# Patient Record
Sex: MALE | Race: WHITE | HISPANIC OR LATINO | Employment: FULL TIME | URBAN - METROPOLITAN AREA
[De-identification: names, ages, dates, MRNs, and addresses within clinical notes are randomized per-mention and may not be internally consistent; named-entity substitution may affect disease eponyms.]

---

## 2023-06-16 ENCOUNTER — HOSPITAL ENCOUNTER (EMERGENCY)
Facility: HOSPITAL | Age: 23
Discharge: HOME | End: 2023-06-16
Attending: EMERGENCY MEDICINE
Payer: COMMERCIAL

## 2023-06-16 VITALS
TEMPERATURE: 97.6 F | HEART RATE: 83 BPM | HEIGHT: 65 IN | RESPIRATION RATE: 17 BRPM | DIASTOLIC BLOOD PRESSURE: 75 MMHG | WEIGHT: 220 LBS | BODY MASS INDEX: 36.65 KG/M2 | SYSTOLIC BLOOD PRESSURE: 124 MMHG | OXYGEN SATURATION: 99 %

## 2023-06-16 DIAGNOSIS — M54.50 LUMBAR BACK PAIN: Primary | ICD-10-CM

## 2023-06-16 PROCEDURE — 25010000002 KETOROLAC TROMETHAMINE PER 15 MG: Performed by: EMERGENCY MEDICINE

## 2023-06-16 RX ORDER — HYDROCODONE BITARTRATE AND ACETAMINOPHEN 5; 325 MG/1; MG/1
1 TABLET ORAL EVERY 8 HOURS PRN
Qty: 15 TABLET | Refills: 0 | Status: SHIPPED | OUTPATIENT
Start: 2023-06-16

## 2023-06-16 RX ORDER — CYCLOBENZAPRINE HCL 10 MG
10 TABLET ORAL 3 TIMES DAILY PRN
Qty: 21 TABLET | Refills: 0 | Status: SHIPPED | OUTPATIENT
Start: 2023-06-16

## 2023-06-16 RX ORDER — KETOROLAC TROMETHAMINE 30 MG/ML
30 INJECTION, SOLUTION INTRAMUSCULAR; INTRAVENOUS EVERY 6 HOURS PRN
Status: DISCONTINUED | OUTPATIENT
Start: 2023-06-16 | End: 2023-06-16 | Stop reason: HOSPADM

## 2023-06-16 RX ADMIN — KETOROLAC TROMETHAMINE 30 MG: 30 INJECTION, SOLUTION INTRAMUSCULAR; INTRAVENOUS at 09:01

## 2023-06-16 NOTE — ED NOTES
First contact; pt was ambulatory from waiting room but in obvious pain and slow to walk due to back pain; reports immediately prior to arrival he was @ work and lifted a light weight box and immediately had onset 10/10 lower back pain; hx slipped disk approx 2 years ago with following back strain to R lower back all while in [employer]; has not had problems with pain in approx 1 year does not take daily pain med; most comfortable flat in gurney; gowned and on continuous SPO2, bp monitor; VSS @ this time with regular unlabored respirations on room air; 3/5 strength to RLE due to pain; 4/5 strength LLE; denies and tingling or loss of sensation to extremities; no loss of bladder or bowel control; call light in place; CTM pending MD roth

## 2023-06-16 NOTE — Clinical Note
[place]  [facility] EMERGENCY DEPARTMENT  [address]  [place] [address]  Phone: [phone]    Theodore Lewis was seen and treated in our emergency department on 6/16/2023.  He may return to work on 06/19/2023.         Thank you for choosing [facility].    Damián Suarez MD

## 2023-06-16 NOTE — ED PROVIDER NOTES
"Subjective   History of Present Illness  23-year-old male past medical history significant for \"slipped disc \"in his back for several years.  He states that usually about once a year he has a similar presentation yesterday of sudden onset of back pain when bending over to  something at work today.  Patient states he has difficulty ambulating due to pain and needs to bend forward to help with the pain.  Patient is able to ambulate and did drive him himself here in a private vehicle.  Incident occurred approximately 1 and half hours prior to arrival this morning while at work.  Patient has no other complaints.  Patient is taken no medication for such.  Patient is asking for work note.  Patient denies headache visual changes neck pain jaw pain shortness of breath chest pain anorexia nausea vomiting or diarrhea.    Review of Systems   Constitutional:  Negative for activity change, appetite change, chills, diaphoresis, fatigue and fever.   HENT:  Negative for congestion, sinus pressure, sneezing and sore throat.    Eyes:  Negative for photophobia and visual disturbance.   Respiratory:  Negative for cough and shortness of breath.    Cardiovascular:  Negative for chest pain, palpitations and leg swelling.   Gastrointestinal:  Negative for abdominal distention, abdominal pain, diarrhea, nausea and vomiting.   Genitourinary:  Negative for dysuria and flank pain.   Musculoskeletal:  Positive for back pain. Negative for arthralgias and myalgias.   Skin:  Negative for rash.   Neurological:  Negative for dizziness, weakness and headaches.   Psychiatric/Behavioral:  Negative for behavioral problems and confusion.      Past Medical History:   Diagnosis Date    Injury of back        No Known Allergies    Past Surgical History:   Procedure Laterality Date    TONSILLECTOMY         History reviewed. No pertinent family history.    Social History     Socioeconomic History    Marital status: Significant Other   Tobacco Use    " Smoking status: Former     Packs/day: 0.50     Years: 8.00     Pack years: 4.00     Types: Cigarettes     Quit date: 2021     Years since quittin.0    Smokeless tobacco: Never   Vaping Use    Vaping Use: Never used   Substance and Sexual Activity    Alcohol use: Not Currently    Drug use: Never    Sexual activity: Defer           Objective   Physical Exam  Vitals and nursing note reviewed.   Constitutional:       General: He is not in acute distress.     Appearance: Normal appearance. He is not toxic-appearing or diaphoretic.   HENT:      Head: Normocephalic and atraumatic.      Mouth/Throat:      Mouth: Mucous membranes are moist.   Eyes:      Conjunctiva/sclera: Conjunctivae normal.   Cardiovascular:      Rate and Rhythm: Normal rate.      Pulses: Normal pulses.   Pulmonary:      Effort: Pulmonary effort is normal. No respiratory distress.      Breath sounds: No wheezing or rales.   Abdominal:      General: Abdomen is flat. There is no distension.      Tenderness: There is no abdominal tenderness.   Musculoskeletal:         General: No swelling or signs of injury.      Cervical back: Normal range of motion and neck supple.      Thoracic back: Normal.      Lumbar back: Tenderness present. No swelling, edema, deformity, signs of trauma, lacerations or bony tenderness. Decreased range of motion. Positive right straight leg raise test. Negative left straight leg raise test.        Back:    Skin:     General: Skin is warm and dry.      Findings: No rash.   Neurological:      General: No focal deficit present.      Mental Status: He is alert and oriented to person, place, and time.   Psychiatric:         Mood and Affect: Mood normal.         Behavior: Behavior normal.       Procedures           ED Course  ED Course as of 23 0912      0856 Patient given Toradol IM in the emergency room as patient drove private vehicle and will need to drive home.  Will prescribe pain medication and muscle  relaxants as well as information for referral for back specialist.  Patient states he understands and agrees with plan of discharge home with follow-up as needed as well as return emergency room for new persistent or worsening symptoms. [BH]      ED Course User Index  [] Damián Suarez MD                                           Medical Decision Making  My differential diagnosis for back pain includes but is not limited to:  Musculoskeletal strain, contusion, retroperitoneal hematoma, disc protrusion, vertebral fracture, transverse process fracture, rib fracture, facet syndrome, sacroiliac joint strain, sciatica, renal injury, splenic injury, pancreatic injury, osteoarthritis, lumbar spondylosis, spinal stenosis, ankylosing spondylitis, sacroiliac joint inflammation, pancreatitis, perforated peptic ulcer, diverticulitis, endometriosis, chronic PID, epidural abscess, osteomyelitis, retroperitoneal abscess, pyelonephritis, pneumonia, subphrenic abscess, tuberculosis, neurofibroma, meningioma, multiple myeloma, lymphoma, metastatic cancer, primary cancer, AAA, aortic dissection, spinal ischemia, referred pain, ureterolithiasis     Risk  Prescription drug management.        Final diagnoses:   Lumbar back pain       ED Disposition  ED Disposition       ED Disposition   Discharge    Condition   Stable    Comment   --               Noe Dorado MD  [address] [place] [address] [phone]    Schedule an appointment as soon as possible for a visit   As needed    Kaushik Carr MD  [address] [facility] [address] [phone]    Schedule an appointment as soon as possible for a visit   As needed    [facility] Emergency Department  [address] [address] [phone]  Go to   As needed         Medication List        New Prescriptions      cyclobenzaprine 10 MG tablet  Commonly known as: FLEXERIL  Take 1 tablet by mouth 3 (Three) Times a Day As Needed for  Muscle Spasms.     HYDROcodone-acetaminophen 5-325 MG per tablet  Commonly known as: NORCO  Take 1 tablet by mouth Every 8 (Eight) Hours As Needed for Moderate Pain.               Where to Get Your Medications        These medications were sent to [id number] - [place] [address] [facility] [address] - [phone] PH - [phone]   [address] [place] [address]      Phone: [phone]   cyclobenzaprine 10 MG tablet  HYDROcodone-acetaminophen 5-325 MG per tablet            Damián Suarez MD  06/16/23 0954

## 2023-06-16 NOTE — Clinical Note
[place]  [facility] EMERGENCY DEPARTMENT  [address]  [place] [address]  Phone: [phone]    Theodore Lewis was seen and treated in our emergency department on 6/16/2023.  He may return to work on 06/19/2023.         Thank you for choosing [facility].    Damián Suarez MD

## 2023-10-31 ENCOUNTER — PATIENT ROUNDING (BHMG ONLY) (OUTPATIENT)
Dept: FAMILY MEDICINE CLINIC | Facility: CLINIC | Age: 23
End: 2023-10-31
Payer: COMMERCIAL

## 2023-10-31 ENCOUNTER — OFFICE VISIT (OUTPATIENT)
Dept: FAMILY MEDICINE CLINIC | Facility: CLINIC | Age: 23
End: 2023-10-31
Payer: COMMERCIAL

## 2023-10-31 VITALS
RESPIRATION RATE: 20 BRPM | OXYGEN SATURATION: 99 % | WEIGHT: 233 LBS | BODY MASS INDEX: 38.82 KG/M2 | DIASTOLIC BLOOD PRESSURE: 82 MMHG | HEART RATE: 105 BPM | HEIGHT: 65 IN | SYSTOLIC BLOOD PRESSURE: 112 MMHG

## 2023-10-31 DIAGNOSIS — Z00.00 ENCOUNTER FOR WELL ADULT EXAM WITHOUT ABNORMAL FINDINGS: Primary | ICD-10-CM

## 2023-10-31 PROBLEM — D70.9 NEUTROPENIA: Status: ACTIVE | Noted: 2017-04-27

## 2023-10-31 PROBLEM — R11.2 NAUSEA AND VOMITING: Status: ACTIVE | Noted: 2017-04-25

## 2023-10-31 PROBLEM — M79.10 MUSCLE PAIN: Status: ACTIVE | Noted: 2017-04-25

## 2023-10-31 PROBLEM — R53.83 LACK OF ENERGY: Status: ACTIVE | Noted: 2017-05-01

## 2023-10-31 PROBLEM — R10.9 ABDOMINAL PAIN: Status: ACTIVE | Noted: 2017-04-25

## 2023-10-31 PROBLEM — M54.50 LOW BACK PAIN: Status: ACTIVE | Noted: 2017-01-10

## 2023-10-31 PROBLEM — M54.6 PAIN IN THORACIC SPINE: Status: ACTIVE | Noted: 2017-01-10

## 2023-10-31 NOTE — PROGRESS NOTES
A [employer] message has been sent to the patient for PATIENT ROUNDING with [facility]

## 2023-10-31 NOTE — PROGRESS NOTES
Frank Noonan DO  [facility] PRIMARY CARE  [facility]  [place] [address] [phone]    Subjective      Name Theodore Lewis MRN [id number]    2000 AGE/SEX 23 y.o. / male      Chief Complaint Chief Complaint   Patient presents with    Establish Care     New patient here to establish care         Visit History for  10/31/2023    History of Present Illness  Theodore Lewis 23 y.o. male who presents for an Annual Wellness Visit. He has a history of   Patient Active Problem List   Diagnosis    Acute pharyngitis    Acute upper respiratory infection    Adolescent idiopathic scoliosis    Idiopathic scoliosis of lumbar spine    Brain concussion    Dental caries    Disorder of male genital organ    Disorder of nail    Inflammation of intestine due to virus    Injury of elbow    Intestinal infectious disease    Lack of energy    Abdominal pain    Backache    Backache    Low back pain    Lumbar sprain    Muscle pain    Nausea and vomiting    Neutropenia    Pain in limb    Pain in thoracic spine    Injury of musculoskeletal system    Scoliosis deformity of spine    Viral disease     From [place] in [place].  Moved to [place] for a change of scenery.      Works at a [employer] and works from home.      Has a history of back pain from a slipped disk from wt lifting accident.  Did not have surgery, but did do physical therapy.  Still has constant pain.  Has been to a chiropractor before, but did not feel this did much for him.  Not currently doing anything for his back.  Trying to stay away of medication if possible.   Wt was 166 lbs when he first hurt his back.       Current Life Goals: Would really like to lose wt and will start trying to walk about 30 min a day.       Patient Care Team:  Frank Noonan DO as PCP - General (Family Medicine)      Health Habits     Diet & Exercise:       Diet:     [x] Generally Healthy   [] Low Carb    [] Vegetarian     [] Generally Unhealthy   []  "Gluten Free  [] Vegan       Exercise:     Type: none  Frequency: 0 times/week.       Tobacco Use:     Social History     Tobacco Use   Smoking Status Former    Packs/day: 0.50    Years: 8.00    Additional pack years: 0.00    Total pack years: 4.00    Types: Cigarettes    Quit date: 2021    Years since quittin.4   Smokeless Tobacco Never        Theodore Lewis  reports that he quit smoking about 2 years ago. His smoking use included cigarettes. He has a 4.00 pack-year smoking history. He has never used smokeless tobacco..             Alcohol Use:     Social History     Substance and Sexual Activity   Alcohol Use Not Currently         Counseling Given: [] Yes  [x] No       Dental Exam:   [] Up to date  [] Scheduled  [x] Needed   Last Exam: More than year     Eye Exam:   [x] Up to date  [] Scheduled  [] Needed   Last Exam: 2023     Screenings:     PHQ-9 Depression Screening  Little interest or pleasure in doing things? 0-->not at all   Feeling down, depressed, or hopeless? 0-->not at all   Trouble falling or staying asleep, or sleeping too much?     Feeling tired or having little energy?     Poor appetite or overeating?     Feeling bad about yourself - or that you are a failure or have let yourself or your family down?     Trouble concentrating on things, such as reading the newspaper or watching television?     Moving or speaking so slowly that other people could have noticed? Or the opposite - being so fidgety or restless that you have been moving around a lot more than usual?     Thoughts that you would be better off dead, or of hurting yourself in some way?     PHQ-9 Total Score 0   If you checked off any problems, how difficult have these problems made it for you to do your work, take care of things at home, or get along with other people?        Hepatitis C Screening:   No results found for: \"HEPCVIRUSABY\"    Lung Cancer Screening: Qualifies? [] Yes  [x] No   Completed:    Colon Cancer Screening: " "  Last Completed Colonoscopy       This patient has no relevant Health Maintenance data.             Prostate Cancer Screening:   No results found for: \"PSA\"       Advance Care Planning  Patient does not have an advance directive, information provided.    Review of Systems    The following portions of the patient's history were reviewed and updated as appropriate: allergies, current medications, past family history, past medical history, past social history, past surgical history and problem list.     Past Medical, Family, Social History     Medical History: has a past medical history of Injury of back.   Surgical History: has a past surgical history that includes Tonsillectomy.   Family History: family history includes Cancer in his maternal grandmother and mother; Hyperlipidemia in his father; Hypertension in his father.   Social History: reports that he quit smoking about 2 years ago. His smoking use included cigarettes. He has a 4.00 pack-year smoking history. He has never used smokeless tobacco. He reports that he does not currently use alcohol. He reports that he does not use drugs.       Medications and Allergies   Current Outpatient Medications   Medication Instructions    cyclobenzaprine (FLEXERIL) 10 mg, Oral, 3 Times Daily PRN    HYDROcodone-acetaminophen (NORCO) 5-325 MG per tablet 1 tablet, Oral, Every 8 Hours PRN     No Known Allergies       Objective:      Vitals Vitals:    10/31/23 1401   BP: 112/82   BP Location: Right arm   Patient Position: Sitting   Cuff Size: Large Adult   Pulse: 105   Resp: 20   SpO2: 99%   Weight: 106 kg (233 lb)   Height: 165.1 cm (65\")     Body mass index is 38.77 kg/m².    Physical Exam  Vitals reviewed.   Constitutional:       General: He is not in acute distress.     Appearance: He is not ill-appearing.   Cardiovascular:      Rate and Rhythm: Normal rate and regular rhythm.   Pulmonary:      Effort: Pulmonary effort is normal.      Breath sounds: Normal breath sounds. "   Neurological:      Mental Status: He is alert.   Psychiatric:         Mood and Affect: Mood normal.         Behavior: Behavior normal.         Thought Content: Thought content normal.         Judgment: Judgment normal.            Assessment/Plan      Issues Addressed/ Plan   Diagnosis Plan   1. Encounter for well adult exam without abnormal findings          Overall patient is in good health.  No abnormal findings on physical exam.  Patient would like to discuss weight loss further at a subsequent visit.    Discussion:    Wears seat belt? [x] Yes  [] No     Wears sunscreen? [x] Yes  [] No      BMI: Body mass index is 38.77 kg/m².              There are no Patient Instructions on file for this visit.      Follow up  recommended Return in about 2 weeks (around 11/14/2023) for Wt check.     Frank Noonan, DO

## 2023-11-14 ENCOUNTER — OFFICE VISIT (OUTPATIENT)
Dept: FAMILY MEDICINE CLINIC | Facility: CLINIC | Age: 23
End: 2023-11-14
Payer: COMMERCIAL

## 2023-11-14 VITALS
OXYGEN SATURATION: 97 % | DIASTOLIC BLOOD PRESSURE: 68 MMHG | WEIGHT: 231.6 LBS | HEIGHT: 65 IN | HEART RATE: 94 BPM | RESPIRATION RATE: 20 BRPM | SYSTOLIC BLOOD PRESSURE: 118 MMHG | BODY MASS INDEX: 38.59 KG/M2

## 2023-11-14 DIAGNOSIS — M54.50 CHRONIC BILATERAL LOW BACK PAIN WITHOUT SCIATICA: ICD-10-CM

## 2023-11-14 DIAGNOSIS — G89.29 CHRONIC BILATERAL LOW BACK PAIN WITHOUT SCIATICA: ICD-10-CM

## 2023-11-14 DIAGNOSIS — E66.09 CLASS 2 OBESITY DUE TO EXCESS CALORIES WITHOUT SERIOUS COMORBIDITY WITH BODY MASS INDEX (BMI) OF 38.0 TO 38.9 IN ADULT: Primary | ICD-10-CM

## 2023-11-14 NOTE — PROGRESS NOTES
"    Frank Noonan DO  [facility] PRIMARY CARE  [address]  [place] [id number] [phone]    Subjective      Name Theodore Lewis MRN [id number]    2000 AGE/SEX 23 y.o. / male      Chief Complaint Chief Complaint   Patient presents with    Back Pain     Follow up          Visit History for  2023    History of Present Illness  Theodore Lewis is a 23 y.o. male who presented today for Back Pain (Follow up )    The patient presents for a follow-up on back pain and weight loss.    In the last 2 weeks, has lost at least 2 pounds. Has tried to change diet and reduce snacking, which has been working well. Has been taking 30-minute walks.     Has been doing some back stretches in the mornings for back pain, which has helped a lot in alleviating the pain. When first started walking, was experiencing back pain, but this has improved upon beginning the stretches.       Medications and Allergies   No current outpatient medications    No Known Allergies   I have reviewed the above medications and allergies     Objective:      Vitals Vitals:    23 1356   BP: 118/68   BP Location: Right arm   Patient Position: Sitting   Cuff Size: Adult   Pulse: 94   Resp: 20   SpO2: 97%   Weight: 105 kg (231 lb 9.6 oz)   Height: 165.1 cm (65\")     Body mass index is 38.54 kg/m².    Physical Exam  Vitals reviewed.   Constitutional:       General: He is not in acute distress.     Appearance: He is not ill-appearing.   Pulmonary:      Effort: Pulmonary effort is normal.   Psychiatric:         Mood and Affect: Mood normal.         Behavior: Behavior normal.         Thought Content: Thought content normal.         Judgment: Judgment normal.            Assessment/Plan      Issues Addressed/ Plan  1. Class 2 obesity due to excess calories without serious comorbidity with body mass index (BMI) of 38.0 to 38.9 in adult  Encouraged to continue working on weight loss and maintaining a healthy diet.    2. " Chronic bilateral low back pain without sciatica  Continue doing back stretches.      There are no Patient Instructions on file for this visit.         Follow up  recommended Return in about 3 months (around 2/14/2024) for Wt check.   - Dragon voice recognition software was utilized to complete this chart.  Every reasonable attempt was made to edit and correct the text, however some incorrect words may remain.   Frank Noonan DO       Transcribed from ambient dictation for Frank Noonan DO by Lou Wayne.  11/14/23   18:04 EST    Patient or patient representative verbalized consent to the visit recording.  I have personally performed the services described in this document as transcribed by the above individual, and it is both accurate and complete.

## 2024-03-15 ENCOUNTER — HOSPITAL ENCOUNTER (EMERGENCY)
Facility: HOSPITAL | Age: 24
Discharge: HOME | End: 2024-03-15
Attending: EMERGENCY MEDICINE
Payer: COMMERCIAL

## 2024-03-15 VITALS
RESPIRATION RATE: 16 BRPM | OXYGEN SATURATION: 99 % | HEART RATE: 81 BPM | TEMPERATURE: 98.7 F | HEIGHT: 65 IN | DIASTOLIC BLOOD PRESSURE: 80 MMHG | SYSTOLIC BLOOD PRESSURE: 129 MMHG | BODY MASS INDEX: 38.65 KG/M2 | WEIGHT: 232 LBS

## 2024-03-15 DIAGNOSIS — M54.41 ACUTE MIDLINE LOW BACK PAIN WITH RIGHT-SIDED SCIATICA: Primary | ICD-10-CM

## 2024-03-15 PROCEDURE — 25010000002 KETOROLAC TROMETHAMINE PER 15 MG: Performed by: EMERGENCY MEDICINE

## 2024-03-15 PROCEDURE — 25010000002 MORPHINE PER 10 MG: Performed by: EMERGENCY MEDICINE

## 2024-03-15 PROCEDURE — 63710000001 PREDNISONE PER 1 MG: Performed by: EMERGENCY MEDICINE

## 2024-03-15 PROCEDURE — 99283 EMERGENCY DEPT VISIT LOW MDM: CPT

## 2024-03-15 PROCEDURE — 96372 THER/PROPH/DIAG INJ SC/IM: CPT

## 2024-03-15 RX ORDER — ORPHENADRINE CITRATE 100 MG/1
100 TABLET, EXTENDED RELEASE ORAL 2 TIMES DAILY PRN
Qty: 30 TABLET | Refills: 0 | Status: SHIPPED | OUTPATIENT
Start: 2024-03-15

## 2024-03-15 RX ORDER — PREDNISONE 20 MG/1
60 TABLET ORAL ONCE
Status: COMPLETED | OUTPATIENT
Start: 2024-03-15 | End: 2024-03-15

## 2024-03-15 RX ORDER — KETOROLAC TROMETHAMINE 30 MG/ML
30 INJECTION, SOLUTION INTRAMUSCULAR; INTRAVENOUS EVERY 6 HOURS PRN
Status: DISCONTINUED | OUTPATIENT
Start: 2024-03-15 | End: 2024-03-15 | Stop reason: HOSPADM

## 2024-03-15 RX ORDER — HYDROCODONE BITARTRATE AND ACETAMINOPHEN 5; 325 MG/1; MG/1
1 TABLET ORAL EVERY 6 HOURS PRN
Qty: 12 TABLET | Refills: 0 | Status: SHIPPED | OUTPATIENT
Start: 2024-03-15

## 2024-03-15 RX ORDER — METHYLPREDNISOLONE 4 MG/1
TABLET ORAL
Qty: 21 TABLET | Refills: 0 | Status: SHIPPED | OUTPATIENT
Start: 2024-03-15

## 2024-03-15 RX ADMIN — PREDNISONE 60 MG: 20 TABLET ORAL at 02:37

## 2024-03-15 RX ADMIN — KETOROLAC TROMETHAMINE 30 MG: 30 INJECTION, SOLUTION INTRAMUSCULAR; INTRAVENOUS at 02:37

## 2024-03-15 RX ADMIN — MORPHINE SULFATE 4 MG: 4 INJECTION, SOLUTION INTRAMUSCULAR; INTRAVENOUS at 02:39

## 2024-03-15 NOTE — Clinical Note
[place]  [facility] EMERGENCY DEPARTMENT  [address] [place] [address]  Phone: [phone]    Theodore Lewis was seen and treated in our emergency department on 3/15/2024.  He may return to work on 03/18/2024.         Thank you for choosing [facility].    Boom Batres MD

## 2024-03-15 NOTE — ED PROVIDER NOTES
Subjective   History of Present Illness  25 yo male presents w low back pain, started yesterday, tried ibuprofen w no relief. Had one episode prior about a year ago. Similar location, bilateral lower lumbar spine, radiates to right hip. Ambulates normally. No urinary retention or incontinence, or sensory loss, no motor weakness. Says he works at home and sits at the computer for long hours, o/w denies any injury. No fever. No flank pain, dysuria, hematuria, nausea or vomiting. Not in pain management or seen spine specialist.     History provided by:  Patient      Review of Systems   All other systems reviewed and are negative.      Past Medical History:   Diagnosis Date    Injury of back        No Known Allergies    Past Surgical History:   Procedure Laterality Date    TONSILLECTOMY         Family History   Problem Relation Age of Onset    Cancer Mother         breast cancer    Hypertension Father     Hyperlipidemia Father     Cancer Maternal Grandmother         liver       Social History     Socioeconomic History    Marital status: Significant Other   Tobacco Use    Smoking status: Former     Current packs/day: 0.00     Average packs/day: 0.5 packs/day for 8.0 years (4.0 ttl pk-yrs)     Types: Cigarettes     Start date: 2013     Quit date: 2021     Years since quittin.7    Smokeless tobacco: Never   Vaping Use    Vaping status: Never Used   Substance and Sexual Activity    Alcohol use: Not Currently    Drug use: Never    Sexual activity: Defer           Objective   Physical Exam  Vitals and nursing note reviewed.   Constitutional:       General: He is not in acute distress.     Appearance: Normal appearance. He is not ill-appearing or toxic-appearing.   HENT:      Head: Normocephalic.      Nose: Nose normal.      Mouth/Throat:      Mouth: Mucous membranes are moist.   Eyes:      Pupils: Pupils are equal, round, and reactive to light.   Cardiovascular:      Rate and Rhythm: Normal rate and regular  rhythm.      Pulses: Normal pulses.      Heart sounds: Normal heart sounds.   Pulmonary:      Effort: Pulmonary effort is normal. No respiratory distress.      Breath sounds: Normal breath sounds.   Abdominal:      Palpations: Abdomen is soft.      Tenderness: There is no abdominal tenderness.   Musculoskeletal:         General: No tenderness or deformity. Normal range of motion.      Cervical back: Normal range of motion.   Skin:     General: Skin is warm.   Neurological:      General: No focal deficit present.      Mental Status: He is alert.   Psychiatric:         Mood and Affect: Mood normal.         Procedures           ED Course                                             Medical Decision Making  Discussed w patient treatment plan, and that I wouldn't be poerforming imaging given no trauma, he understands, goal of treatment to decrease pain to functional level w IM meds, and DC short course of norco, not in pain mgmt, understands dependency potential and to take as needed, also muscle relaxers and steroids, provided pain management follow up if problems persist.     Problems Addressed:  Acute midline low back pain with right-sided sciatica: complicated acute illness or injury    Risk  Prescription drug management.        Final diagnoses:   Acute midline low back pain with right-sided sciatica       ED Disposition  ED Disposition       ED Disposition   Discharge    Condition   Stable    Comment   --               Frank Noonan, DO  [address] [facility] [address] [phone]    Schedule an appointment as soon as possible for a visit       [facility]  [address] [place], [place] [id number] [phone]  Call   As needed, If symptoms worsen         Medication List        New Prescriptions      HYDROcodone-acetaminophen 5-325 MG per tablet  Commonly known as: NORCO  Take 1 tablet by mouth Every 6 (Six) Hours As Needed for Severe Pain for up to 12 doses.      methylPREDNISolone 4 MG dose pack  Commonly known as: MEDROL  Take as directed on package instructions.     orphenadrine 100 MG 12 hr tablet  Commonly known as: NORFLEX  Take 1 tablet by mouth 2 (Two) Times a Day As Needed for Muscle Spasms for up to 30 doses.               Where to Get Your Medications        These medications were sent to [id number] - [place], [place] [address] AT [facility] [address] - [phone]  - [phone] [place] [address], [place] [address]      Phone: [phone]   HYDROcodone-acetaminophen 5-325 MG per tablet  methylPREDNISolone 4 MG dose pack  orphenadrine 100 MG 12 hr tablet            Boom Batres MD  03/15/24 7841

## 2024-03-27 ENCOUNTER — OFFICE VISIT (OUTPATIENT)
Dept: FAMILY MEDICINE CLINIC | Facility: CLINIC | Age: 24
End: 2024-03-27
Payer: COMMERCIAL

## 2024-03-27 VITALS
HEIGHT: 65 IN | WEIGHT: 240 LBS | DIASTOLIC BLOOD PRESSURE: 84 MMHG | RESPIRATION RATE: 18 BRPM | SYSTOLIC BLOOD PRESSURE: 102 MMHG | BODY MASS INDEX: 39.99 KG/M2 | OXYGEN SATURATION: 98 % | HEART RATE: 74 BPM

## 2024-03-27 DIAGNOSIS — M54.41 ACUTE RIGHT-SIDED LOW BACK PAIN WITH RIGHT-SIDED SCIATICA: Primary | ICD-10-CM

## 2024-03-27 DIAGNOSIS — M54.41 ACUTE MIDLINE LOW BACK PAIN WITH RIGHT-SIDED SCIATICA: ICD-10-CM

## 2024-03-27 RX ORDER — METHOCARBAMOL 500 MG/1
500 TABLET, FILM COATED ORAL 4 TIMES DAILY
Qty: 40 TABLET | Refills: 0 | Status: SHIPPED | OUTPATIENT
Start: 2024-03-27

## 2024-03-27 RX ORDER — HYDROCODONE BITARTRATE AND ACETAMINOPHEN 5; 325 MG/1; MG/1
1 TABLET ORAL EVERY 6 HOURS PRN
Qty: 12 TABLET | Refills: 0 | Status: SHIPPED | OUTPATIENT
Start: 2024-03-27

## 2024-03-27 RX ORDER — MELOXICAM 7.5 MG/1
7.5 TABLET ORAL DAILY
Qty: 30 TABLET | Refills: 2 | Status: SHIPPED | OUTPATIENT
Start: 2024-03-27

## 2024-03-27 NOTE — PROGRESS NOTES
Frank Noonan DO  [facility] PRIMARY CARE  [address]  [place] [address] [phone]    Subjective      Name Theodore Lewis MRN [id number]    2000 AGE/SEX 24 y.o. / male      Chief Complaint Chief Complaint   Patient presents with    Hospital Follow Up Visit     Was seen in ED for back pain. Has not had improvement, ROM causes discomfort          Visit History for  2024    History of Present Illness  Theodore Lewis is a 24 y.o. male who presented today for Hospital Follow Up Visit (Was seen in ED for back pain. Has not had improvement, ROM causes discomfort )    Chronic back pain issues and now experiencing a flareup. Walks for 30 minutes every day and is closely monitoring diet. The patient was approved for food stamps, and this has made making healthier choices easier. Overall, back pain has improved, and recent flare up caused concerns. Complains of pain in entire back hurt, particularly in lower lumbar area. The pain progressively worsened throughout the week. When laying down the pain resolves, but if stretching ankle in any way, the pain radiates up leg and causes pain immediately with or without medication. The patient is unsure if the pain is related to a nerve being affected or if it is solely the back. Patient was prescribed hydrocodone, prednisone, and Norflex for the pain which decreases but does not alleviate the pain. The pain occasionally starts at the back and then radiates to the left and then down toward lower extremities. Yesterday, the pain was so intense that the patient was severely nauseous and could not eat.        Medications and Allergies   Current Outpatient Medications   Medication Instructions    HYDROcodone-acetaminophen (NORCO) 5-325 MG per tablet 1 tablet, Oral, Every 6 Hours PRN    meloxicam (MOBIC) 7.5 mg, Oral, Daily    methocarbamol (ROBAXIN) 500 mg, Oral, 4 Times Daily    methylPREDNISolone (MEDROL) 4 MG dose pack Take as  "directed on package instructions.    orphenadrine (NORFLEX) 100 mg, Oral, 2 Times Daily PRN     No Known Allergies   I have reviewed the above medications and allergies     Objective:      Vitals Vitals:    03/27/24 1138   BP: 102/84   BP Location: Left arm   Patient Position: Sitting   Cuff Size: Large Adult   Pulse: 74   Resp: 18   SpO2: 98%   Weight: 109 kg (240 lb)   Height: 165.1 cm (65\")     Body mass index is 39.94 kg/m².    Physical Exam  Vitals reviewed.   Constitutional:       General: He is not in acute distress.     Appearance: He is not ill-appearing.   Pulmonary:      Effort: Pulmonary effort is normal.   Psychiatric:         Mood and Affect: Mood normal.         Behavior: Behavior normal.         Thought Content: Thought content normal.         Judgment: Judgment normal.            Assessment/Plan      Issues Addressed/ Plan   Diagnosis Plan   1. Acute right-sided low back pain with right-sided sciatica  meloxicam (Mobic) 7.5 MG tablet    XR Spine Lumbar 2 or 3 View    methocarbamol (ROBAXIN) 500 MG tablet      2. Acute midline low back pain with right-sided sciatica  HYDROcodone-acetaminophen (NORCO) 5-325 MG per tablet    methocarbamol (ROBAXIN) 500 MG tablet         1. Back pain.  This is a temporary occurrence where the muscle spasms. The patient has sciatic pain. An order was placed for a lower back x-ray to rule out listhesis. Prescribed meloxicam and Robaxin for pain relief and refilled hydrocodone. Advised the patient to avoid ibuprofen or aspirin. Osteopathic manipulation was performed today.    - Patient was found to have somatic dysfunction contributing to their discomfort and was treated with OMT today.  OMT was aimed at increasing range of motion, decreasing pain, and treatment of somatic dysfunction.      - Patient tolerated today's treatment well with improvements noted both objectively and subjectively on reevaluation.    - Advised the patient that they may experience increased " soreness for 1-2 days post-treatment.  This can be ameliorated with increased water intake, heat or ice application, and gentle stretches.        There are no Patient Instructions on file for this visit.            Follow up  recommended Return if symptoms worsen or fail to improve.   - Dragon voice recognition software was utilized to complete this chart.  Every reasonable attempt was made to edit and correct the text, however some incorrect words may remain.       Transcribed from ambient dictation for Frank Noonan DO by Rhiannon Glover.  03/27/24   12:45 EDT    Patient or patient representative verbalized consent to the visit recording.  I have personally performed the services described in this document as transcribed by the above individual, and it is both accurate and complete.

## 2024-04-29 ENCOUNTER — OFFICE VISIT (OUTPATIENT)
Dept: FAMILY MEDICINE CLINIC | Facility: CLINIC | Age: 24
End: 2024-04-29
Payer: COMMERCIAL

## 2024-04-29 VITALS
RESPIRATION RATE: 18 BRPM | OXYGEN SATURATION: 98 % | HEART RATE: 84 BPM | WEIGHT: 246 LBS | DIASTOLIC BLOOD PRESSURE: 104 MMHG | SYSTOLIC BLOOD PRESSURE: 138 MMHG | HEIGHT: 65 IN | BODY MASS INDEX: 40.98 KG/M2

## 2024-04-29 DIAGNOSIS — M54.41 ACUTE MIDLINE LOW BACK PAIN WITH RIGHT-SIDED SCIATICA: ICD-10-CM

## 2024-04-29 DIAGNOSIS — M54.41 ACUTE RIGHT-SIDED LOW BACK PAIN WITH RIGHT-SIDED SCIATICA: ICD-10-CM

## 2024-04-29 PROCEDURE — 1159F MED LIST DOCD IN RCRD: CPT | Performed by: STUDENT IN AN ORGANIZED HEALTH CARE EDUCATION/TRAINING PROGRAM

## 2024-04-29 PROCEDURE — 1160F RVW MEDS BY RX/DR IN RCRD: CPT | Performed by: STUDENT IN AN ORGANIZED HEALTH CARE EDUCATION/TRAINING PROGRAM

## 2024-04-29 PROCEDURE — 99213 OFFICE O/P EST LOW 20 MIN: CPT | Performed by: STUDENT IN AN ORGANIZED HEALTH CARE EDUCATION/TRAINING PROGRAM

## 2024-04-29 RX ORDER — METHOCARBAMOL 500 MG/1
500 TABLET, FILM COATED ORAL 4 TIMES DAILY
Qty: 40 TABLET | Refills: 0 | Status: SHIPPED | OUTPATIENT
Start: 2024-04-29 | End: 2024-05-02 | Stop reason: SDUPTHER

## 2024-04-30 NOTE — PROGRESS NOTES
Frank Noonan DO  [facility] PRIMARY CARE  [address]  [place] [address] [phone]    Subjective      Name Theodore Lewis MRN [id number]    2000 AGE/SEX 24 y.o. / male      Chief Complaint Chief Complaint   Patient presents with    Back Pain     Still having lower back pain           Visit History for  2024    History of Present Illness  Theodore Lewis is a 24 y.o. male who presented today for Back Pain (Still having lower back pain/)      History of Present Illness  The patient presents for evaluation of low back pain.    The patient, a 3-year-old young child, has been experiencing persistent left-sided low back pain, which has significantly impeded his ability to work and rise from bed. The onset of the pain is unclear, as he was up until 2 a.m. today due to his young children's illness. He recalls an incident where he twisted his back while moving his bed. Despite the persistent acute pain, he has not undergone physical therapy. He quantifies his pain as a constant 4 on a scale of 10, which intensifies when he bends down or picks something up, but not to the extent that it impedes his functionality. The pain is present even during physical activities such as walking, sitting down, or lying down. He has been managing his pain with meloxicam and hydrocodone, having only consumed two hydrocodone tablets today due to the absence of pain. He describes the pain as akin to bruising, radiating into his left buttock. He has been performing stretches, but finds them extremely painful. He has been using Biofreeze for pain management.   His mother was recently diagnosed with an inflamed liver.        Medications and Allergies   Current Outpatient Medications   Medication Instructions    HYDROcodone-acetaminophen (NORCO) 5-325 MG per tablet 1 tablet, Oral, Every 6 Hours PRN    meloxicam (MOBIC) 7.5 mg, Oral, Daily    methocarbamol (ROBAXIN) 500 mg, Oral, 4 Times Daily     "orphenadrine (NORFLEX) 100 mg, Oral, 2 Times Daily PRN     No Known Allergies   I have reviewed the above medications and allergies     Objective:      Vitals Vitals:    04/29/24 1432   BP: (!) 138/104   BP Location: Left arm   Patient Position: Sitting   Cuff Size: Adult   Pulse: 84   Resp: 18   SpO2: 98%   Weight: 112 kg (246 lb)   Height: 165.1 cm (65\")     Body mass index is 40.94 kg/m².    Physical Exam  Physical Exam  Positive seated flexion test on the left side of the back. Pain is almost over the base of the scapula into L5 and over the SI joint.   Results          Assessment/Plan      Issues Addressed/ Plan   Diagnosis Plan   1. Acute right-sided low back pain with right-sided sciatica        2. Acute midline low back pain with right-sided sciatica           Assessment & Plan  1. Left-sided low back pain.  The patient was counseled to apply ice to the affected area for a duration of 15 to 20 minutes, followed by a heat pack for a period of 15 to 20 minutes. He was cautioned against using a heat pad for more than 30 minutes before falling asleep. The application of ice and heat every 2 hours was recommended. A refill of methocarbamol, to be taken four times daily, was provided. The patient was also advised to take Tylenol, with a maximum of 4000 mg, as needed.           There are no Patient Instructions on file for this visit.        Follow up  recommended Return if symptoms worsen or fail to improve.   - Dragon voice recognition software was utilized to complete this chart.  Every reasonable attempt was made to edit and correct the text, however some incorrect words may remain.   Frank Noonan DO    Patient or patient representative verbalized consent for the use of Ambient Listening during the visit with  Frank Noonan DO for chart documentation. 5/13/2024  01:48 EDT             "

## 2024-05-02 ENCOUNTER — TELEPHONE (OUTPATIENT)
Dept: INTERNAL MEDICINE | Facility: CLINIC | Age: 24
End: 2024-05-02
Payer: COMMERCIAL

## 2024-05-02 DIAGNOSIS — M54.41 ACUTE RIGHT-SIDED LOW BACK PAIN WITH RIGHT-SIDED SCIATICA: ICD-10-CM

## 2024-05-02 DIAGNOSIS — M54.41 ACUTE MIDLINE LOW BACK PAIN WITH RIGHT-SIDED SCIATICA: ICD-10-CM

## 2024-05-02 RX ORDER — METHOCARBAMOL 500 MG/1
500 TABLET, FILM COATED ORAL 4 TIMES DAILY
Qty: 40 TABLET | Refills: 0 | Status: SHIPPED | OUTPATIENT
Start: 2024-05-02

## 2024-05-02 RX ORDER — MELOXICAM 7.5 MG/1
7.5 TABLET ORAL DAILY
Qty: 30 TABLET | Refills: 2 | Status: SHIPPED | OUTPATIENT
Start: 2024-05-02

## 2024-05-02 NOTE — TELEPHONE ENCOUNTER
Theodore Keller  [facility] [place] [facility] (supporting Frank Noonan DO)3 minutes ago (2:20 PM)       Also feels like all my muscles from the lower back and below are tensing up a lot.       Theodore Keller [facility] Clinical Pool (supporting Frank Noonan DO)5 minutes ago (2:19 PM)       Good afternoon Doctor Saran,      I'm not sure what could've caused the pain that I'm feeling but it feels like a constant muscle spasm going to both legs resulting in a pulsing pain. The main location of the pain in my back is relatively not as strong as the pain in my legs and my right foot seems to be tingling almost all the time if I can't find a comfortable way of sitting or laying down. The best relief now is actually walking, but I also can't walk very long. So I'm not sure what to do.

## 2024-05-02 NOTE — TELEPHONE ENCOUNTER
He has probably exacerbated the same back issue again, but now worse.  He can double the Mobic for a few days by taking one in the morning and one at  night.  I will send a refill for the robaxin to be taken every 4 hours.  May need to be seen again in the office

## 2024-05-08 ENCOUNTER — PROCEDURE VISIT (OUTPATIENT)
Dept: FAMILY MEDICINE CLINIC | Facility: CLINIC | Age: 24
End: 2024-05-08
Payer: COMMERCIAL

## 2024-05-08 VITALS
BODY MASS INDEX: 40.48 KG/M2 | HEART RATE: 76 BPM | OXYGEN SATURATION: 96 % | DIASTOLIC BLOOD PRESSURE: 84 MMHG | RESPIRATION RATE: 18 BRPM | SYSTOLIC BLOOD PRESSURE: 122 MMHG | HEIGHT: 65 IN | WEIGHT: 243 LBS

## 2024-05-08 DIAGNOSIS — M54.41 ACUTE MIDLINE LOW BACK PAIN WITH RIGHT-SIDED SCIATICA: ICD-10-CM

## 2024-05-08 DIAGNOSIS — M54.41 ACUTE RIGHT-SIDED LOW BACK PAIN WITH RIGHT-SIDED SCIATICA: Primary | ICD-10-CM

## 2024-05-08 PROCEDURE — 99213 OFFICE O/P EST LOW 20 MIN: CPT | Performed by: STUDENT IN AN ORGANIZED HEALTH CARE EDUCATION/TRAINING PROGRAM

## 2024-05-08 RX ORDER — HYDROCODONE BITARTRATE AND ACETAMINOPHEN 5; 325 MG/1; MG/1
1 TABLET ORAL EVERY 6 HOURS PRN
Qty: 12 TABLET | Refills: 0 | Status: SHIPPED | OUTPATIENT
Start: 2024-05-08

## 2024-05-25 NOTE — PROGRESS NOTES
Frank Noonan DO  [facility] PRIMARY CARE  [address]  [place] [id number] [phone]    Subjective      Name Theodore Lewis MRN [id number]    2000 AGE/SEX 24 y.o. / male      Chief Complaint Chief Complaint   Patient presents with    Back Pain     Follow up and adjustment          Visit History for  2024    History of Present Illness  Theodore Lewis is a 24 y.o. male who presented today for Back Pain (Follow up and adjustment )     The patient reports an improvement in his condition today compared to the previous day, despite the administration of his narcotic medication. Previously, he experienced excruciating pain even after the double dosage of his narcotic medication. He struggles with sitting on soft surfaces, lying down in certain ways, and being bedridden due to back pain. Activities such as laughing, sneezing, and eating occasionally exacerbate his back pain. Certain foods do not induce pain, but they induce weakness and unusual sensations. He has experienced tingling in his left leg for the past 2 days, which has somewhat alleviated. Most of the time, he experiences tingling in the back of his calves, accompanied by a cold sensation in his lower back. . Despite the pain, he maintains his ability to perform daily activities, such as dishwashing and laundry, which exacerbates his pain to a 4 or 5 on a scale of 1 to 10. The initial adjustment provided relief from a constant 3 to 4, but the recent adjustment exacerbated his pain. He began experiencing numbness in his right foot 4 to 5 days ago, which was initially on the left side of his back, but has since spread across his body. He has been managing his pain with ice, ice, and a cane-sized heating pad, which provides some relief. He was able to sit in his office chair yesterday. Currently, he is not taking any pain relievers. Since his realignment, his bones have been popping, which provides some relief. He  "injured his lower back while in the .        Medications and Allergies   Current Outpatient Medications   Medication Instructions    HYDROcodone-acetaminophen (NORCO) 5-325 MG per tablet 1 tablet, Oral, Every 6 Hours PRN    meloxicam (MOBIC) 7.5 mg, Oral, Daily    methocarbamol (ROBAXIN) 500 mg, Oral, 4 Times Daily    orphenadrine (NORFLEX) 100 mg, Oral, 2 Times Daily PRN     No Known Allergies   I have reviewed the above medications and allergies     Objective:      Vitals Vitals:    05/08/24 1137   BP: 122/84   BP Location: Left arm   Patient Position: Sitting   Cuff Size: Large Adult   Pulse: 76   Resp: 18   SpO2: 96%   Weight: 110 kg (243 lb)   Height: 165.1 cm (65\")     Body mass index is 40.44 kg/m².    Physical Exam  Vitals reviewed.   Constitutional:       General: He is not in acute distress.     Appearance: He is not ill-appearing.   Pulmonary:      Effort: Pulmonary effort is normal.   Psychiatric:         Mood and Affect: Mood normal.         Behavior: Behavior normal.         Thought Content: Thought content normal.         Judgment: Judgment normal.       Physical Exam  Positive seated flexion test on the left side of the back. Tightness noted in the hamstrings.   Results          Assessment/Plan      Issues Addressed/ Plan   Diagnosis Plan   1. Acute right-sided low back pain with right-sided sciatica  Ambulatory Referral to Physical Therapy      2. Acute midline low back pain with right-sided sciatica  HYDROcodone-acetaminophen (NORCO) 5-325 MG per tablet         Assessment & Plan  1. Back pain.  An x-ray will be conducted to rule out any underlying conditions. A referral for physical therapy will be made for a duration of 2 weeks. The patient is advised to perform straight leg, hamstring, and calf stretches, but not with socks. The patient is instructed to take meloxicam tonight. A refill of hydrocodone will be provided. Should the patient's numbness and tingling in his legs worsen instead " of improving, an MRI will be necessary.           There are no Patient Instructions on file for this visit.        Follow up  recommended Return if symptoms worsen or fail to improve.   - Dragon voice recognition software was utilized to complete this chart.  Every reasonable attempt was made to edit and correct the text, however some incorrect words may remain.   Frank Noonan DO    Patient or patient representative verbalized consent for the use of Ambient Listening during the visit with  Frank Noonan DO for chart documentation. 5/25/2024  01:19 EDT

## 2024-06-07 ENCOUNTER — TELEPHONE (OUTPATIENT)
Dept: FAMILY MEDICINE CLINIC | Facility: CLINIC | Age: 24
End: 2024-06-07
Payer: COMMERCIAL

## 2024-06-07 NOTE — TELEPHONE ENCOUNTER
Caller: MARY [employer]IGHT [employer]    Relationship:     Best call back number: [phone]    What is the best time to reach you: ASAP    What was the call regarding: [employer] IS TRYING TO DETERMINE IF MOST RECENT VISIT IN MAY ARE BEING RELATED TO WORKERS COMP INJURY FROM JUNE OF 2023. THEY WERE PROVIDED A WORK SLIP FROM EMPLOYER BUT WAS NOT GIVEN DIAGNOSIS. THEY WERE NOT BILLED FOR SERVICES. PLEASE ADVISE.

## 2024-08-08 ENCOUNTER — HOSPITAL ENCOUNTER (EMERGENCY)
Facility: HOSPITAL | Age: 24
Discharge: HOME | End: 2024-08-08
Attending: STUDENT IN AN ORGANIZED HEALTH CARE EDUCATION/TRAINING PROGRAM
Payer: COMMERCIAL

## 2024-08-08 VITALS
RESPIRATION RATE: 18 BRPM | OXYGEN SATURATION: 98 % | DIASTOLIC BLOOD PRESSURE: 85 MMHG | SYSTOLIC BLOOD PRESSURE: 121 MMHG | TEMPERATURE: 98.3 F | HEIGHT: 65 IN | WEIGHT: 244 LBS | BODY MASS INDEX: 40.65 KG/M2 | HEART RATE: 86 BPM

## 2024-08-08 DIAGNOSIS — M54.32 SCIATICA, LEFT SIDE: Primary | ICD-10-CM

## 2024-08-08 PROCEDURE — 99283 EMERGENCY DEPT VISIT LOW MDM: CPT

## 2024-08-08 RX ORDER — DIAZEPAM 5 MG/1
5 TABLET ORAL EVERY 6 HOURS PRN
Status: DISCONTINUED | OUTPATIENT
Start: 2024-08-08 | End: 2024-08-09 | Stop reason: HOSPADM

## 2024-08-08 RX ORDER — CYCLOBENZAPRINE HCL 5 MG
5 TABLET ORAL 3 TIMES DAILY PRN
Qty: 30 TABLET | Refills: 0 | Status: SHIPPED | OUTPATIENT
Start: 2024-08-08

## 2024-08-08 RX ORDER — IBUPROFEN 600 MG/1
600 TABLET ORAL EVERY 6 HOURS PRN
Qty: 30 TABLET | Refills: 0 | Status: SHIPPED | OUTPATIENT
Start: 2024-08-08

## 2024-08-08 RX ORDER — PREDNISONE 50 MG/1
50 TABLET ORAL DAILY
Qty: 5 TABLET | Refills: 0 | Status: SHIPPED | OUTPATIENT
Start: 2024-08-08 | End: 2024-08-13

## 2024-08-08 RX ORDER — CYCLOBENZAPRINE HCL 10 MG
10 TABLET ORAL ONCE
Status: COMPLETED | OUTPATIENT
Start: 2024-08-08 | End: 2024-08-08

## 2024-08-08 RX ADMIN — IBUPROFEN 600 MG: 200 TABLET, FILM COATED ORAL at 21:32

## 2024-08-08 RX ADMIN — DIAZEPAM 5 MG: 5 TABLET ORAL at 21:32

## 2024-08-08 RX ADMIN — CYCLOBENZAPRINE 10 MG: 10 TABLET, FILM COATED ORAL at 21:32

## 2024-08-08 NOTE — Clinical Note
[place]  [facility] EMERGENCY DEPARTMENT  [address] [place] [address]  Phone: [phone]    Theodore Lewis was seen and treated in our emergency department on 8/8/2024.  He may return to work on 08/13/2024.         Thank you for choosing [facility].    Mika Sanford, DO

## 2024-08-09 NOTE — DISCHARGE INSTRUCTIONS

## 2024-08-09 NOTE — ED PROVIDER NOTES
"Subjective   History of Present Illness  This is a healthy 24-year-old who presents to the emergency department with left low back pain that radiates down the left posterior and lateral aspect of his leg.  It is a sharp shooting type pain.  He describes that he has had this since he lifted a dresser couple of days ago.  Denies any numbness, tingling, loss of sensation, urinary or bowel abnormality such as incontinence or retention.  He denies any paresthesias or difficulty with ambulation.  He states that he has had this in the past and was supposed to get an MRI but \"forgot or got too busy.\"  He denies any new or changing symptoms today.      Review of Systems   Constitutional:  Negative for activity change, chills, diaphoresis and fever.   HENT: Negative.     Respiratory: Negative.     Cardiovascular: Negative.    Skin: Negative.    Neurological: Negative.    All other systems reviewed and are negative.      Past Medical History:   Diagnosis Date    Injury of back        No Known Allergies    Past Surgical History:   Procedure Laterality Date    TONSILLECTOMY         Family History   Problem Relation Age of Onset    Cancer Mother         breast cancer    Hypertension Father     Hyperlipidemia Father     Cancer Maternal Grandmother         liver       Social History     Socioeconomic History    Marital status: Significant Other   Tobacco Use    Smoking status: Former     Current packs/day: 0.00     Average packs/day: 0.5 packs/day for 8.0 years (4.0 ttl pk-yrs)     Types: Cigarettes     Start date: 6/1/2013     Quit date: 6/1/2021     Years since quitting: 3.1    Smokeless tobacco: Never   Vaping Use    Vaping status: Never Used   Substance and Sexual Activity    Alcohol use: Not Currently    Drug use: Never    Sexual activity: Yes     Partners: Female     Birth control/protection: None           Objective   Physical Exam  Vitals and nursing note reviewed.   Constitutional:       Appearance: Normal appearance. He " is normal weight. He is not ill-appearing, toxic-appearing or diaphoretic.   HENT:      Head: Normocephalic and atraumatic.      Right Ear: External ear normal.      Left Ear: External ear normal.      Nose: Nose normal. No congestion or rhinorrhea.      Mouth/Throat:      Pharynx: No oropharyngeal exudate or posterior oropharyngeal erythema.   Eyes:      Extraocular Movements: Extraocular movements intact.      Conjunctiva/sclera: Conjunctivae normal.      Pupils: Pupils are equal, round, and reactive to light.   Cardiovascular:      Rate and Rhythm: Normal rate and regular rhythm.      Pulses: Normal pulses.   Pulmonary:      Effort: Pulmonary effort is normal.      Breath sounds: Normal breath sounds. No stridor. No wheezing, rhonchi or rales.   Chest:      Chest wall: No tenderness.   Abdominal:      General: There is no distension.      Palpations: Abdomen is soft. There is no mass.   Musculoskeletal:         General: No swelling, tenderness or signs of injury. Normal range of motion.      Cervical back: Normal range of motion. No rigidity or tenderness.      Comments: No incontinence or retention, no saddle distribution of numbness or tingling. Dorsiflexion and plantar flexion are 5/5 in strength bilaterally. No limb weakness. No foot drop. No decrease in anal sphincter tone. No bony tenderness noted.   Skin:     General: Skin is warm.      Capillary Refill: Capillary refill takes less than 2 seconds.      Coloration: Skin is not jaundiced or pale.      Findings: No bruising.   Neurological:      General: No focal deficit present.      Mental Status: He is alert and oriented to person, place, and time. Mental status is at baseline.      Motor: No weakness.         Procedures           ED Course                                             Medical Decision Making    MDM:    Escalation of care including admission/observation considered    - Discussions of management with other providers:  None    -  Discussed/reviewed with Radiology regarding test interpretation    - Independent interpretation: None    - Additional patient history obtained from: Partner    - Review of external non-ED record (if available):  Prior Inpt record, Office record, Outpt record, Prior Outpt labs, PCP record, Outside ED record, Other    - Chronic conditions affecting care: See HPI and medical Hx.    - Social Determinants of health significantly affecting care:  None        Medical Decision Making Discussion:    This is a well-appearing 24-year-old who presents to the emergency department with what appears to be sciatic type pain.  The patient has a normal neurological examination, no tenderness at the midline of the lumbar spine.  The patient has no difficulty with ambulation but he does have some radiating/shooting pain down the left lateral leg.  The patient given pain control here, I will give a short course of steroid and NSAID use at home.  Recommend physical therapy on an outpatient basis as well as follow-up with PCP.  As there is no red flag symptom of low back injury or suspicion for spinal cord compromise, the patient is well-appearing and stable for discharge at this time.  Patient otherwise well-appearing, he and his spouse are very agreeable to this plan.    The patient has been given very strict return precautions to return to the emergency department should there be any acute change or worsening of their condition.  I have explained my findings and the patient has expressed understanding to me.  I explained that the work-up performed in the ED has been based on the specific complaint and concern, as the nature of emergency medicine is complaint driven and they understand that new symptoms may arise.  I have told them that, should there be any new symptoms, worsening or changing symptoms, a new work-up may be indicated that they are encouraged to return to the emergency department or promptly contact their primary care  physician. We have employed a shared decision-making process as the discussion of their disposition.  The patient has been educated as to the nature of the visit, the tests and work-up performed and the findings from today's visit. At this time, there does not appear to be any acute emergent process that necessitates admission to the hospital, however, the patient understands that this can change unexpectedly. At this time, the patient is stable for discharge home and agrees to follow-up with her primary care physician in the next 24 to 48 hours or earlier should they be able to obtain an appointment.    The patient was counseled regarding diagnostic results and treatment plan and patient has indicated understanding of these instructions.      Risk  Prescription drug management.        Final diagnoses:   Sciatica, left side       ED Disposition  ED Disposition       ED Disposition   Discharge    Condition   Good    Comment   --               Frank Noonan,   [address] [facility] [address] [phone]               Medication List        New Prescriptions      cyclobenzaprine 5 MG tablet  Commonly known as: FLEXERIL  Take 1 tablet by mouth 3 (Three) Times a Day As Needed for Muscle Spasms.     ibuprofen 600 MG tablet  Commonly known as: ADVIL,MOTRIN  Take 1 tablet by mouth Every 6 (Six) Hours As Needed for Mild Pain.     predniSONE 50 MG tablet  Commonly known as: DELTASONE  Take 1 tablet by mouth Daily for 5 days.               Where to Get Your Medications        These medications were sent to [id number] [place] [address] AT [facility] & [address] - [phone] PH - [phone] [address] [address]      Phone: [phone]   cyclobenzaprine 5 MG tablet  ibuprofen 600 MG tablet  predniSONE 50 MG tablet            Mika Sanford, DO  08/08/24 3842

## 2024-08-13 ENCOUNTER — OFFICE VISIT (OUTPATIENT)
Dept: FAMILY MEDICINE CLINIC | Facility: CLINIC | Age: 24
End: 2024-08-13
Payer: COMMERCIAL

## 2024-08-13 VITALS
WEIGHT: 247 LBS | TEMPERATURE: 97.3 F | BODY MASS INDEX: 41.15 KG/M2 | SYSTOLIC BLOOD PRESSURE: 110 MMHG | DIASTOLIC BLOOD PRESSURE: 78 MMHG | HEART RATE: 83 BPM | HEIGHT: 65 IN | OXYGEN SATURATION: 97 %

## 2024-08-13 DIAGNOSIS — M54.42 ACUTE LEFT-SIDED LOW BACK PAIN WITH LEFT-SIDED SCIATICA: Primary | ICD-10-CM

## 2024-08-13 PROCEDURE — 1160F RVW MEDS BY RX/DR IN RCRD: CPT | Performed by: PHYSICIAN ASSISTANT

## 2024-08-13 PROCEDURE — 1159F MED LIST DOCD IN RCRD: CPT | Performed by: PHYSICIAN ASSISTANT

## 2024-08-13 PROCEDURE — 99213 OFFICE O/P EST LOW 20 MIN: CPT | Performed by: PHYSICIAN ASSISTANT

## 2024-08-13 NOTE — PROGRESS NOTES
"Chief Complaint  Back Pain (Pain off and on left side for 1 week after lifting heavy items)    Subjective        Theodore Lewis presents to [facility]  History of Present Illness    Patient is a 24-year-old male presenting today for ER follow-up after being seen for left-sided back pain and sciatica on 8/8/2024.  Patient was first seen with this issue back in March, followed up with Dr. Noonan and was taking meloxicam and methocarbamol for the pain.  More recently he has been prescribed a course of steroids as well as acetaminophen 600 mg and as needed Flexeril.  Patient has not been taking the Flexeril but states the acetaminophen and steroids have been helping make the pain more manageable.  He was also referred to physical therapy in May but missed their call to schedule his intake appointment and never followed up with them.  He does endorse numbness and tingling down his left leg.  He denies any bowel or bladder dysfunction or lower extremity muscle weakness.    Objective   Vital Signs:  /78   Pulse 83   Temp 97.3 °F (36.3 °C)   Ht 165.1 cm (65\")   Wt 112 kg (247 lb)   SpO2 97%   BMI 41.10 kg/m²   Estimated body mass index is 41.1 kg/m² as calculated from the following:    Height as of this encounter: 165.1 cm (65\").    Weight as of this encounter: 112 kg (247 lb).            Physical Exam  Constitutional:       General: He is not in acute distress.     Appearance: Normal appearance. He is not toxic-appearing.   Cardiovascular:      Rate and Rhythm: Normal rate and regular rhythm.      Heart sounds: No murmur heard.     No friction rub. No gallop.   Pulmonary:      Effort: Pulmonary effort is normal. No respiratory distress.      Breath sounds: Normal breath sounds. No wheezing, rhonchi or rales.   Musculoskeletal:      Lumbar back: Tenderness present.   Skin:     General: Skin is warm and dry.   Neurological:      General: No focal deficit present.      Mental " Status: He is alert and oriented to person, place, and time.   Psychiatric:         Mood and Affect: Mood normal.         Behavior: Behavior normal.         Thought Content: Thought content normal.         Judgment: Judgment normal.        Result Review :                Assessment and Plan   Diagnoses and all orders for this visit:    1. Acute left-sided low back pain with left-sided sciatica (Primary)  -     Ambulatory Referral to Physical Therapy for Evaluation & Treatment    Recent referral for physical therapy.  Patient is agreeable to the plan.  Attached information to his AVS about simple stretches and exercises needed at home to help manage sciatica.  Follow-up as needed         Follow Up   No follow-ups on file.  Patient was given instructions and counseling regarding his condition or for health maintenance advice. Please see specific information pulled into the AVS if appropriate.

## 2024-09-10 ENCOUNTER — OFFICE VISIT (OUTPATIENT)
Dept: FAMILY MEDICINE CLINIC | Facility: CLINIC | Age: 24
End: 2024-09-10
Payer: COMMERCIAL

## 2024-09-10 VITALS
OXYGEN SATURATION: 96 % | HEIGHT: 65 IN | HEART RATE: 108 BPM | RESPIRATION RATE: 18 BRPM | BODY MASS INDEX: 41.15 KG/M2 | WEIGHT: 247 LBS | SYSTOLIC BLOOD PRESSURE: 128 MMHG | DIASTOLIC BLOOD PRESSURE: 92 MMHG

## 2024-09-10 DIAGNOSIS — M54.50 ACUTE BILATERAL LOW BACK PAIN WITHOUT SCIATICA: Primary | ICD-10-CM

## 2024-09-10 PROCEDURE — 1159F MED LIST DOCD IN RCRD: CPT | Performed by: STUDENT IN AN ORGANIZED HEALTH CARE EDUCATION/TRAINING PROGRAM

## 2024-09-10 PROCEDURE — 99213 OFFICE O/P EST LOW 20 MIN: CPT | Performed by: STUDENT IN AN ORGANIZED HEALTH CARE EDUCATION/TRAINING PROGRAM

## 2024-09-10 PROCEDURE — 1160F RVW MEDS BY RX/DR IN RCRD: CPT | Performed by: STUDENT IN AN ORGANIZED HEALTH CARE EDUCATION/TRAINING PROGRAM

## 2024-10-09 ENCOUNTER — PROCEDURE VISIT (OUTPATIENT)
Dept: FAMILY MEDICINE CLINIC | Facility: CLINIC | Age: 24
End: 2024-10-09
Payer: COMMERCIAL

## 2024-10-09 VITALS
RESPIRATION RATE: 18 BRPM | OXYGEN SATURATION: 98 % | DIASTOLIC BLOOD PRESSURE: 82 MMHG | SYSTOLIC BLOOD PRESSURE: 128 MMHG | HEART RATE: 82 BPM | HEIGHT: 65 IN | WEIGHT: 248 LBS | BODY MASS INDEX: 41.32 KG/M2

## 2024-10-09 DIAGNOSIS — R25.2 SPASM: ICD-10-CM

## 2024-10-09 DIAGNOSIS — M54.50 ACUTE BILATERAL LOW BACK PAIN WITHOUT SCIATICA: Primary | ICD-10-CM

## 2024-10-09 PROCEDURE — 99213 OFFICE O/P EST LOW 20 MIN: CPT | Performed by: STUDENT IN AN ORGANIZED HEALTH CARE EDUCATION/TRAINING PROGRAM

## 2024-10-09 RX ORDER — MELOXICAM 15 MG/1
15 TABLET ORAL DAILY
Qty: 30 TABLET | Refills: 2 | Status: SHIPPED | OUTPATIENT
Start: 2024-10-09

## 2024-10-09 RX ORDER — CYCLOBENZAPRINE HCL 10 MG
10 TABLET ORAL 3 TIMES DAILY
Qty: 42 TABLET | Refills: 0 | Status: SHIPPED | OUTPATIENT
Start: 2024-10-09

## 2024-10-23 NOTE — PROGRESS NOTES
Frank Noonan DO  [facility]  [address]  [place] [id number] [phone]    Subjective      Name Theodore Lewis MRN [id number]    2000 AGE/SEX 24 y.o. / male      Chief Complaint Chief Complaint   Patient presents with    Back Pain     Lower back and hip, feels like upper back muscles are tensing up. Painful to sit and stand has been taking left over hydrocodone to help with pain as well as muscle relaxer and ibuprofen          Visit History for  10/09/2024    Theodore Lewis is a 24 y.o. male who presented today for Back Pain (Lower back and hip, feels like upper back muscles are tensing up. Painful to sit and stand has been taking left over hydrocodone to help with pain as well as muscle relaxer and ibuprofen )       History of Present Illness  He has been experiencing persistent back pain since the previous , despite adhering to the prescribed treatment regimen. The pain is primarily located in his left leg and radiates up to his groin. He describes the pain as a 7 on a scale of 1 to 10. He has been taking hydrocodone, ibuprofen 800 mg, and cyclobenzaprine, but these medications have not provided significant relief.    He also reports a popping sensation in his knee and ankle when he lifts his leg while lying down. This action provides temporary relief, but the pain returns shortly after. He experiences difficulty standing, sitting, and lying down for extended periods due to the pain.    He has been attempting to manage the pain with sit-ups and push-ups, but these exercises have not been effective. Additionally, his ankle is still causing him discomfort. He has been using Biofreeze for additional relief.       Medications and Allergies   Current Outpatient Medications   Medication Instructions    cyclobenzaprine (FLEXERIL) 10 mg, Oral, 3 times daily    meloxicam (MOBIC) 15 mg, Oral, Daily     No Known Allergies   I have reviewed the above medications  "and allergies     Objective:      Vitals Vitals:    10/09/24 1027   BP: 128/82   BP Location: Left arm   Patient Position: Standing   Cuff Size: Large Adult   Pulse: 82   Resp: 18   SpO2: 98%   Weight: 112 kg (248 lb)   Height: 165.1 cm (65\")     Body mass index is 41.27 kg/m².    Physical Exam  Vitals reviewed.   Constitutional:       General: He is not in acute distress.     Appearance: He is not ill-appearing.   Pulmonary:      Effort: Pulmonary effort is normal.   Psychiatric:         Mood and Affect: Mood normal.         Behavior: Behavior normal.         Thought Content: Thought content normal.         Judgment: Judgment normal.          Physical Exam       Results       Assessment/Plan   Issues Addressed/ Plan   Diagnosis Plan   1. Acute bilateral low back pain without sciatica  meloxicam (MOBIC) 15 MG tablet    cyclobenzaprine (FLEXERIL) 10 MG tablet      2. Spasm  cyclobenzaprine (FLEXERIL) 10 MG tablet         Assessment & Plan  1. Back pain.  The patient's back pain is likely due to muscle tightness in the quadriceps and hamstrings, which is exerting pressure on the lower back and limiting mobility. He has been using hydrocodone, ibuprofen 800 mg, and cyclobenzaprine with limited relief. A regimen of straight leg stretches was recommended, utilizing a towel or sheet for assistance. A prescription for meloxicam was provided for use over a week, with instructions to avoid concurrent use with ibuprofen. The dosage of Flexeril was increased, and Tylenol was suggested for breakthrough pain. Topical application of Biofreeze was also advised.               There are no Patient Instructions on file for this visit.   Follow up  recommended Return if symptoms worsen or fail to improve.   - Dragon voice recognition software was utilized to complete this chart.  Every reasonable attempt was made to edit and correct the text, however some incorrect words may remain.   Frank Noonan, DO    Patient or patient " representative verbalized consent for the use of Ambient Listening during the visit with  Frakn Noonan DO for chart documentation. 10/22/2024  23:55 EDT

## 2025-01-07 ENCOUNTER — OFFICE VISIT (OUTPATIENT)
Dept: FAMILY MEDICINE CLINIC | Facility: CLINIC | Age: 25
End: 2025-01-07
Payer: COMMERCIAL

## 2025-01-07 VITALS — SYSTOLIC BLOOD PRESSURE: 126 MMHG | HEART RATE: 82 BPM | OXYGEN SATURATION: 96 % | DIASTOLIC BLOOD PRESSURE: 78 MMHG

## 2025-01-07 DIAGNOSIS — M54.41 ACUTE RIGHT-SIDED LOW BACK PAIN WITH RIGHT-SIDED SCIATICA: ICD-10-CM

## 2025-01-07 DIAGNOSIS — M54.42 ACUTE LEFT-SIDED LOW BACK PAIN WITH LEFT-SIDED SCIATICA: ICD-10-CM

## 2025-01-07 DIAGNOSIS — R25.2 SPASM: Primary | ICD-10-CM

## 2025-01-07 PROCEDURE — 1160F RVW MEDS BY RX/DR IN RCRD: CPT | Performed by: STUDENT IN AN ORGANIZED HEALTH CARE EDUCATION/TRAINING PROGRAM

## 2025-01-07 PROCEDURE — 1159F MED LIST DOCD IN RCRD: CPT | Performed by: STUDENT IN AN ORGANIZED HEALTH CARE EDUCATION/TRAINING PROGRAM

## 2025-01-07 PROCEDURE — 99213 OFFICE O/P EST LOW 20 MIN: CPT | Performed by: STUDENT IN AN ORGANIZED HEALTH CARE EDUCATION/TRAINING PROGRAM

## 2025-01-07 RX ORDER — BACLOFEN 10 MG/1
10 TABLET ORAL 3 TIMES DAILY
Qty: 21 TABLET | Refills: 2 | Status: SHIPPED | OUTPATIENT
Start: 2025-01-07

## 2025-01-07 RX ORDER — PREDNISONE 20 MG/1
20 TABLET ORAL DAILY
Qty: 4 TABLET | Refills: 0 | Status: SHIPPED | OUTPATIENT
Start: 2025-01-07 | End: 2025-01-11

## 2025-01-07 NOTE — PROGRESS NOTES
Frank Noonan DO  [facility]  [address]  [place] [id number] [phone]    Subjective      Name Theodore Lewis MRN [id number]    2000 AGE/SEX 24 y.o. / male      Chief Complaint Chief Complaint   Patient presents with    Back Pain     Lower left back pain         Visit History for  2025    Theodore Lewis is a 24 y.o. male who presented today for Back Pain (Lower left back pain)       History of Present Illness    He reports a recurrence of his back pain, which has now extended to the right side. The pain is not as severe as previous episodes, but it is persistent. He describes an incident where he experienced a cracking sound in his back while bending down to lift his son, followed by the onset of pain. Despite maintaining his exercise regimen without any issues, he has been attempting to increase his mobility at work by avoiding prolonged sitting. He has expressed interest in exploring medical marijuana as a potential treatment option. He has been managing the pain with prednisone 10 mg, which he had on hand from a previous work-related injury. He has abstained from taking prednisone today due to uncertainty about potential drug interactions. The pain was absent yesterday but has returned today, although less intense than initially. He has not previously tried baclofen.       Medications and Allergies   Current Outpatient Medications   Medication Instructions    baclofen (LIORESAL) 10 mg, Oral, 3 Times Daily    meloxicam (MOBIC) 15 mg, Oral, Daily    predniSONE (DELTASONE) 20 mg, Oral, Daily     No Known Allergies   I have reviewed the above medications and allergies     Objective:      Vitals Vitals:    25 1026   BP: 126/78   BP Location: Left arm   Patient Position: Sitting   Cuff Size: Adult   Pulse: 82   SpO2: 96%     There is no height or weight on file to calculate BMI.    Physical Exam  Vitals reviewed.   Constitutional:       General: He  is not in acute distress.     Appearance: He is not ill-appearing.   Pulmonary:      Effort: Pulmonary effort is normal.   Psychiatric:         Mood and Affect: Mood normal.         Behavior: Behavior normal.         Thought Content: Thought content normal.         Judgment: Judgment normal.          Physical Exam       Results       Assessment/Plan   Issues Addressed/ Plan   Diagnosis Plan   1. Spasm  baclofen (LIORESAL) 10 MG tablet    predniSONE (DELTASONE) 20 MG tablet      2. Acute left-sided low back pain with left-sided sciatica  baclofen (LIORESAL) 10 MG tablet    predniSONE (DELTASONE) 20 MG tablet      3. Acute right-sided low back pain with right-sided sciatica  baclofen (LIORESAL) 10 MG tablet    predniSONE (DELTASONE) 20 MG tablet         Assessment & Plan  1. Back pain.  The patient's back pain has recurred. He reports that meloxicam was ineffective, but prednisone 10 mg alleviated the pain. He is advised to focus on weight loss and muscle strengthening exercises, including back yoga and core exercises. The use of medical marijuana for chronic pain was discussed, but current policies do not allow for its prescription. A prescription for baclofen has been provided, with instructions to take two tablets as needed. He is also advised to continue prednisone 20 mg for four days. If the pain persists despite medication, further interventions will be considered.           There are no Patient Instructions on file for this visit.   Follow up  recommended Return if symptoms worsen or fail to improve.   - Dragon voice recognition software was utilized to complete this chart.  Every reasonable attempt was made to edit and correct the text, however some incorrect words may remain.   Frank Noonan DO    Patient or patient representative verbalized consent for the use of Ambient Listening during the visit with  Frank Noonan DO for chart documentation. 1/7/2025  13:08 EST

## 2025-02-21 DIAGNOSIS — M54.50 ACUTE BILATERAL LOW BACK PAIN WITHOUT SCIATICA: ICD-10-CM

## 2025-02-21 RX ORDER — METHYLPREDNISOLONE 4 MG/1
TABLET ORAL
Qty: 21 TABLET | Refills: 0 | OUTPATIENT
Start: 2025-02-21

## 2025-02-21 RX ORDER — MELOXICAM 15 MG/1
15 TABLET ORAL DAILY
Qty: 30 TABLET | Refills: 2 | Status: SHIPPED | OUTPATIENT
Start: 2025-02-21

## 2025-02-21 NOTE — TELEPHONE ENCOUNTER
Caller: JoshuaTheodore pitts    Relationship: Self    Best call back number: [phone]    Requested Prescriptions:   Requested Prescriptions     Pending Prescriptions Disp Refills    methylPREDNISolone (MEDROL) 4 MG dose pack 21 tablet 0     Sig: Take as directed on package instructions.        Pharmacy where request should be sent: [id number] - [place] [address] AT [facility] [address] - [phone] PH - [phone] FX     Last office visit with prescribing clinician: 1/7/2025   Last telemedicine visit with prescribing clinician: Visit date not found   Next office visit with prescribing clinician: 4/1/2025     Additional details provided by patient: OUT OF MEDICATION     Does the patient have less than a 3 day supply:  [x] Yes  [] No      Sulma Hagan Rep   02/21/25 16:23 EST

## 2025-02-22 ENCOUNTER — HOSPITAL ENCOUNTER (EMERGENCY)
Facility: HOSPITAL | Age: 25
Discharge: HOME | End: 2025-02-22
Attending: STUDENT IN AN ORGANIZED HEALTH CARE EDUCATION/TRAINING PROGRAM
Payer: COMMERCIAL

## 2025-02-22 VITALS
TEMPERATURE: 98 F | BODY MASS INDEX: 42.15 KG/M2 | HEART RATE: 68 BPM | OXYGEN SATURATION: 97 % | DIASTOLIC BLOOD PRESSURE: 85 MMHG | HEIGHT: 64 IN | SYSTOLIC BLOOD PRESSURE: 132 MMHG | RESPIRATION RATE: 18 BRPM | WEIGHT: 246.91 LBS

## 2025-02-22 DIAGNOSIS — G89.29 CHRONIC LOW BACK PAIN WITH SCIATICA, SCIATICA LATERALITY UNSPECIFIED, UNSPECIFIED BACK PAIN LATERALITY: ICD-10-CM

## 2025-02-22 DIAGNOSIS — M54.30 SCIATICA, UNSPECIFIED LATERALITY: Primary | ICD-10-CM

## 2025-02-22 DIAGNOSIS — M54.40 CHRONIC LOW BACK PAIN WITH SCIATICA, SCIATICA LATERALITY UNSPECIFIED, UNSPECIFIED BACK PAIN LATERALITY: ICD-10-CM

## 2025-02-22 PROCEDURE — 25010000002 KETOROLAC TROMETHAMINE PER 15 MG: Performed by: STUDENT IN AN ORGANIZED HEALTH CARE EDUCATION/TRAINING PROGRAM

## 2025-02-22 PROCEDURE — 96372 THER/PROPH/DIAG INJ SC/IM: CPT

## 2025-02-22 PROCEDURE — 99283 EMERGENCY DEPT VISIT LOW MDM: CPT | Performed by: STUDENT IN AN ORGANIZED HEALTH CARE EDUCATION/TRAINING PROGRAM

## 2025-02-22 PROCEDURE — 25010000002 METHYLPREDNISOLONE PER 40 MG: Performed by: STUDENT IN AN ORGANIZED HEALTH CARE EDUCATION/TRAINING PROGRAM

## 2025-02-22 RX ORDER — KETOROLAC TROMETHAMINE 30 MG/ML
30 INJECTION, SOLUTION INTRAMUSCULAR; INTRAVENOUS ONCE
Status: COMPLETED | OUTPATIENT
Start: 2025-02-22 | End: 2025-02-22

## 2025-02-22 RX ORDER — METHYLPREDNISOLONE SODIUM SUCCINATE 40 MG/ML
80 INJECTION, POWDER, LYOPHILIZED, FOR SOLUTION INTRAMUSCULAR; INTRAVENOUS ONCE
Status: COMPLETED | OUTPATIENT
Start: 2025-02-22 | End: 2025-02-22

## 2025-02-22 RX ADMIN — METHYLPREDNISOLONE SODIUM SUCCINATE 80 MG: 40 INJECTION, POWDER, FOR SOLUTION INTRAMUSCULAR; INTRAVENOUS at 10:54

## 2025-02-22 RX ADMIN — KETOROLAC TROMETHAMINE 30 MG: 30 INJECTION, SOLUTION INTRAMUSCULAR; INTRAVENOUS at 10:53

## 2025-02-22 NOTE — ED PROVIDER NOTES
Subjective   History of Present Illness  Pt is a 25 y.o. male with PMH as listed who presents for   Chief Complaint   Patient presents with    Sciatica       Patient is a 25-year-old male presents for low back pain, has history of sciatica and states this feels similar to prior statical flares that he has had.  No new numbness tingling or neurologic issues.  He had this pain yesterday and called his PCPs office, they sent in steroid to his pharmacy.  He was unaware of this however and so came to the ED this morning for further evaluation and management.  No chest pain shortness of breath nausea vomiting headache or again new neurologic changes.  All vitals within normal limits, patient in neurologic baseline.      Review of Systems    Past Medical History:   Diagnosis Date    Injury of back     Low back pain        No Known Allergies    Past Surgical History:   Procedure Laterality Date    TONSILLECTOMY         Family History   Problem Relation Age of Onset    Cancer Mother         breast cancer    Hypertension Father     Hyperlipidemia Father     Cancer Maternal Grandmother         liver       Social History     Socioeconomic History    Marital status: Significant Other   Tobacco Use    Smoking status: Former     Current packs/day: 0.00     Average packs/day: 0.5 packs/day for 8.0 years (4.0 ttl pk-yrs)     Types: Cigarettes     Start date: 6/1/2013     Quit date: 6/1/2021     Years since quitting: 3.7    Smokeless tobacco: Never   Vaping Use    Vaping status: Never Used   Substance and Sexual Activity    Alcohol use: Not Currently    Drug use: Never    Sexual activity: Yes     Partners: Female     Birth control/protection: None           Objective   Physical Exam  Constitutional:       Appearance: Normal appearance.   HENT:      Head: Normocephalic and atraumatic.      Mouth/Throat:      Mouth: Mucous membranes are moist.      Pharynx: Oropharynx is clear.   Eyes:      Conjunctiva/sclera: Conjunctivae normal.    Cardiovascular:      Rate and Rhythm: Normal rate.   Pulmonary:      Effort: Pulmonary effort is normal.   Abdominal:      General: Abdomen is flat.   Musculoskeletal:      Cervical back: Neck supple.   Skin:     General: Skin is warm and dry.   Neurological:      General: No focal deficit present.      Mental Status: He is alert and oriented to person, place, and time. Mental status is at baseline.   Psychiatric:         Mood and Affect: Mood normal.         Procedures           ED Course  ED Course as of 02/22/25 1050   Sat Feb 22, 2025   1048 Patient is a 25-year-old male presents for low back pain, has history of sciatica and states this feels similar to prior statical flares that he has had.  No new numbness tingling or neurologic issues.  He had this pain yesterday and called his PCPs office, they sent in steroid to his pharmacy.  He was unaware of this however and so came to the ED this morning for further evaluation and management.  No chest pain shortness of breath nausea vomiting headache or again new neurologic changes.  All vitals within normal limits, patient in neurologic baseline.  Discussed patient plan for treatment with Toradol and Solu-Medrol injections here and to  his Medrol Dosepak from pharmacy and continue and follow-up with his PCP as directed.  Patient understands and agrees.  All questions answered. [JF]      ED Course User Index  [JF] Mika Malagon MD                                                       Medical Decision Making  My differential diagnosis for back pain includes but is not limited to:  Musculoskeletal strain, contusion, retroperitoneal hematoma, disc protrusion, vertebral fracture, transverse process fracture, rib fracture, facet syndrome, sacroiliac joint strain, sciatica, renal injury, splenic injury, pancreatic injury, osteoarthritis, lumbar spondylosis, spinal stenosis, ankylosing spondylitis, sacroiliac joint inflammation, pancreatitis, perforated peptic  ulcer, diverticulitis, endometriosis, chronic PID, epidural abscess, osteomyelitis, retroperitoneal abscess, pyelonephritis, pneumonia, subphrenic abscess, tuberculosis, neurofibroma, meningioma, multiple myeloma, lymphoma, metastatic cancer, primary cancer, AAA, aortic dissection, spinal ischemia, referred pain, ureterolithiasis      Risk  Prescription drug management.        Final diagnoses:   Sciatica, unspecified laterality   Chronic low back pain with sciatica, sciatica laterality unspecified, unspecified back pain laterality       ED Disposition  ED Disposition       ED Disposition   Discharge    Condition   Stable    Comment   --               Frank Noonan, DO  [address] [facility] [address] [phone]    Schedule an appointment as soon as possible for a visit in 2 days  For re-evaluation         Medication List      No changes were made to your prescriptions during this visit.            Mika Malagon MD  02/22/25 3311

## 2025-02-24 ENCOUNTER — TELEPHONE (OUTPATIENT)
Dept: FAMILY MEDICINE CLINIC | Facility: CLINIC | Age: 25
End: 2025-02-24
Payer: COMMERCIAL

## 2025-02-24 DIAGNOSIS — M54.41 ACUTE RIGHT-SIDED LOW BACK PAIN WITH RIGHT-SIDED SCIATICA: ICD-10-CM

## 2025-02-24 DIAGNOSIS — R25.2 SPASM: ICD-10-CM

## 2025-02-24 DIAGNOSIS — M54.42 ACUTE LEFT-SIDED LOW BACK PAIN WITH LEFT-SIDED SCIATICA: ICD-10-CM

## 2025-02-24 NOTE — TELEPHONE ENCOUNTER
Caller: ANGELICA ORNELAS    Relationship: Emergency Contact    Best call back number: [phone]     What medication are you requesting: PREDNISONE    What are your current symptoms: VISUAL BACK SPASMS, COULDN'T STAND UP ON FRIDAY, TAKEN TO HOSPITAL, ALMOST PASSED OUT FROM PAIN, FEELS IN R AND L LEGS NOW    How long have you been experiencing symptoms: SINCE WEDNESDAY 2.19 AND GRADUALLY GOT WORSE    If a prescription is needed, what is your preferred pharmacy and phone number: [facility] [id number] - [place] [address] AT [facility] [address] - [phone]  - [phone] FX     Additional notes:  MEDICATION THAT WAS CALLED IN ON FRIDAY WAS NOT HELPFUL. SWOLLEN AND SUPER TENSE. PREDNISONE HELPS HIM WITH INFLAMMATION.    PLEASE CALL TO ADVISE AS NEEDED.

## 2025-02-28 RX ORDER — BACLOFEN 15 MG/1
15 TABLET ORAL 3 TIMES DAILY
Qty: 42 TABLET | Refills: 0 | Status: SHIPPED | OUTPATIENT
Start: 2025-02-28

## 2025-02-28 NOTE — TELEPHONE ENCOUNTER
Going to resend baclofen at 15 mg.  This should help with spasm.  Continue to take meloxicam.  May also take Tylenol for breakthrough pain.  Can take up to 4000 mg of Tylenol in a day.  May need appointment if he continues to have issues.

## 2025-02-28 NOTE — TELEPHONE ENCOUNTER
Duplicate message, handled in [facility] msg encounter   I called spoke to Cait I also schedule an appointment

## 2025-03-06 ENCOUNTER — PROCEDURE VISIT (OUTPATIENT)
Dept: FAMILY MEDICINE CLINIC | Facility: CLINIC | Age: 25
End: 2025-03-06
Payer: COMMERCIAL

## 2025-03-06 VITALS
OXYGEN SATURATION: 97 % | HEART RATE: 71 BPM | SYSTOLIC BLOOD PRESSURE: 112 MMHG | HEIGHT: 64 IN | BODY MASS INDEX: 41.32 KG/M2 | DIASTOLIC BLOOD PRESSURE: 84 MMHG | WEIGHT: 242 LBS

## 2025-03-06 DIAGNOSIS — E78.2 MIXED HYPERLIPIDEMIA: ICD-10-CM

## 2025-03-06 DIAGNOSIS — R73.9 HYPERGLYCEMIA: ICD-10-CM

## 2025-03-06 DIAGNOSIS — M54.41 ACUTE RIGHT-SIDED LOW BACK PAIN WITH RIGHT-SIDED SCIATICA: Primary | ICD-10-CM

## 2025-03-06 DIAGNOSIS — R53.83 FATIGUE, UNSPECIFIED TYPE: ICD-10-CM

## 2025-03-06 NOTE — PROGRESS NOTES
"    Frank Noonan DO  [facility]  [address]  [place] [id number] [phone]    Subjective      Name Theodore Lewis MRN [id number]    2000 AGE/SEX 25 y.o. / male      Chief Complaint Chief Complaint   Patient presents with    Back Pain     Lower back and hip pain. Pain fluctuates but is mainly at a 5          Visit History for  2025    Theodore Lewis is a 25 y.o. male who presented today for Back Pain (Lower back and hip pain. Pain fluctuates but is mainly at a 5 )       History of Present Illness  The patient presents for evaluation of back pain.    He reports experiencing an unusual sensation in his back, characterized by tingling in his leg during both sitting and walking activities. He expresses a desire to avoid surgical intervention and is seeking alternative treatment options. Additionally, he inquires about the potential benefits of using a rowing machine for his condition.       Medications and Allergies   Current Outpatient Medications   Medication Instructions    Baclofen 15 mg, Oral, 3 Times Daily    meloxicam (MOBIC) 15 mg, Oral, Daily     No Known Allergies   I have reviewed the above medications and allergies     Objective:      Vitals Vitals:    25 0823   BP: 112/84   BP Location: Left arm   Patient Position: Sitting   Cuff Size: Adult   Pulse: 71   SpO2: 97%   Weight: 110 kg (242 lb)   Height: 162.6 cm (64\")   PainSc: 6    PainLoc: Back     Body mass index is 41.54 kg/m².    Physical Exam  Vitals reviewed.   Constitutional:       General: He is not in acute distress.     Appearance: He is not ill-appearing.   Pulmonary:      Effort: Pulmonary effort is normal.   Psychiatric:         Mood and Affect: Mood normal.         Behavior: Behavior normal.         Thought Content: Thought content normal.         Judgment: Judgment normal.          Physical Exam       Results       Assessment/Plan   Issues Addressed/ Plan   Diagnosis Plan   1. " Acute right-sided low back pain with right-sided sciatica  XR Spine Lumbar 2 or 3 View    Ambulatory Referral to Physical Therapy for Evaluation & Treatment      2. Mixed hyperlipidemia  Lipid Panel      3. Hyperglycemia  Hemoglobin A1c    Comprehensive Metabolic Panel      4. Fatigue, unspecified type  TSH    T4, free    CBC w AUTO Differential         Assessment & Plan  1. Back pain.  The patient's symptoms suggest a possible neuropathic component to his back pain, which has not improved with conservative treatments such as medication and stretching. He has experienced a weight loss of 5 pounds. He was advised to be cautious when using a rowing machine, ensuring proper technique to avoid further injury. Walking was recommended over running due to its lower impact on the back. He was encouraged to maintain a healthy diet and stay active to facilitate further weight loss, which could potentially alleviate his back pain. An x-ray of the lumbar spine will be ordered, followed by an MRI if necessary. A referral for physical therapy will be made, with a focus on deep stretching exercises and core muscle strengthening. The physical therapist may also consider dry needling, massage, or TENS as part of the treatment plan.           There are no Patient Instructions on file for this visit.   Follow up  recommended Return in about 3 months (around 6/6/2025).   - Dragon voice recognition software was utilized to complete this chart.  Every reasonable attempt was made to edit and correct the text, however some incorrect words may remain.   Frank Noonan DO    Patient or patient representative verbalized consent for the use of Ambient Listening during the visit with  Frank Noonan DO for chart documentation. 3/6/2025  08:59 EST

## 2025-03-06 NOTE — PROGRESS NOTES
Venipuncture Blood Specimen Collection  Venipuncture performed in left arm by Lizeth Willoughby MA with good hemostasis. Patient tolerated the procedure well without complications.   03/06/25   Lizeth Willoughby MA

## 2025-03-07 LAB
ALBUMIN SERPL-MCNC: 4.2 G/DL (ref 3.5–5.2)
ALBUMIN/GLOB SERPL: 1.6 G/DL
ALP SERPL-CCNC: 83 U/L (ref 39–117)
ALT SERPL-CCNC: 90 U/L (ref 1–41)
AST SERPL-CCNC: 34 U/L (ref 1–40)
BASOPHILS # BLD AUTO: 0.03 10*3/MM3 (ref 0–0.2)
BASOPHILS NFR BLD AUTO: 0.5 % (ref 0–1.5)
BILIRUB SERPL-MCNC: 0.6 MG/DL (ref 0–1.2)
BUN SERPL-MCNC: 15 MG/DL (ref 6–20)
BUN/CREAT SERPL: 17.4 (ref 7–25)
CALCIUM SERPL-MCNC: 9.4 MG/DL (ref 8.6–10.5)
CHLORIDE SERPL-SCNC: 101 MMOL/L (ref 98–107)
CHOLEST SERPL-MCNC: 127 MG/DL (ref 0–200)
CO2 SERPL-SCNC: 25.7 MMOL/L (ref 22–29)
CREAT SERPL-MCNC: 0.86 MG/DL (ref 0.76–1.27)
EGFRCR SERPLBLD CKD-EPI 2021: 122.5 ML/MIN/1.73
EOSINOPHIL # BLD AUTO: 0.08 10*3/MM3 (ref 0–0.4)
EOSINOPHIL NFR BLD AUTO: 1.3 % (ref 0.3–6.2)
ERYTHROCYTE [DISTWIDTH] IN BLOOD BY AUTOMATED COUNT: 12.4 % (ref 12.3–15.4)
GLOBULIN SER CALC-MCNC: 2.6 GM/DL
GLUCOSE SERPL-MCNC: 90 MG/DL (ref 65–99)
HBA1C MFR BLD: 5.6 % (ref 4.8–5.6)
HCT VFR BLD AUTO: 46.5 % (ref 37.5–51)
HDLC SERPL-MCNC: 38 MG/DL (ref 40–60)
HGB BLD-MCNC: 16 G/DL (ref 13–17.7)
IMM GRANULOCYTES # BLD AUTO: 0.01 10*3/MM3 (ref 0–0.05)
IMM GRANULOCYTES NFR BLD AUTO: 0.2 % (ref 0–0.5)
LDLC SERPL CALC-MCNC: 67 MG/DL (ref 0–100)
LYMPHOCYTES # BLD AUTO: 2.32 10*3/MM3 (ref 0.7–3.1)
LYMPHOCYTES NFR BLD AUTO: 37.3 % (ref 19.6–45.3)
MCH RBC QN AUTO: 30.8 PG (ref 26.6–33)
MCHC RBC AUTO-ENTMCNC: 34.4 G/DL (ref 31.5–35.7)
MCV RBC AUTO: 89.6 FL (ref 79–97)
MONOCYTES # BLD AUTO: 0.56 10*3/MM3 (ref 0.1–0.9)
MONOCYTES NFR BLD AUTO: 9 % (ref 5–12)
NEUTROPHILS # BLD AUTO: 3.22 10*3/MM3 (ref 1.7–7)
NEUTROPHILS NFR BLD AUTO: 51.7 % (ref 42.7–76)
NRBC BLD AUTO-RTO: 0 /100 WBC (ref 0–0.2)
PLATELET # BLD AUTO: 328 10*3/MM3 (ref 140–450)
POTASSIUM SERPL-SCNC: 3.8 MMOL/L (ref 3.5–5.2)
PROT SERPL-MCNC: 6.8 G/DL (ref 6–8.5)
RBC # BLD AUTO: 5.19 10*6/MM3 (ref 4.14–5.8)
SODIUM SERPL-SCNC: 138 MMOL/L (ref 136–145)
T4 FREE SERPL-MCNC: 1.31 NG/DL (ref 0.92–1.68)
TRIGL SERPL-MCNC: 123 MG/DL (ref 0–150)
TSH SERPL DL<=0.005 MIU/L-ACNC: 2.33 UIU/ML (ref 0.27–4.2)
VLDLC SERPL CALC-MCNC: 22 MG/DL (ref 5–40)
WBC # BLD AUTO: 6.22 10*3/MM3 (ref 3.4–10.8)

## 2025-03-18 ENCOUNTER — HOSPITAL ENCOUNTER (OUTPATIENT)
Dept: GENERAL RADIOLOGY | Facility: HOSPITAL | Age: 25
Discharge: HOME | End: 2025-03-18
Admitting: STUDENT IN AN ORGANIZED HEALTH CARE EDUCATION/TRAINING PROGRAM
Payer: COMMERCIAL

## 2025-03-18 DIAGNOSIS — M54.41 ACUTE RIGHT-SIDED LOW BACK PAIN WITH RIGHT-SIDED SCIATICA: ICD-10-CM

## 2025-03-18 PROCEDURE — 72100 X-RAY EXAM L-S SPINE 2/3 VWS: CPT

## 2025-07-16 ENCOUNTER — OFFICE VISIT (OUTPATIENT)
Dept: FAMILY MEDICINE CLINIC | Facility: CLINIC | Age: 25
End: 2025-07-16
Payer: COMMERCIAL

## 2025-07-16 VITALS
BODY MASS INDEX: 40.12 KG/M2 | HEART RATE: 64 BPM | HEIGHT: 64 IN | DIASTOLIC BLOOD PRESSURE: 78 MMHG | OXYGEN SATURATION: 97 % | WEIGHT: 235 LBS | RESPIRATION RATE: 18 BRPM | SYSTOLIC BLOOD PRESSURE: 118 MMHG

## 2025-07-16 DIAGNOSIS — R53.83 FATIGUE, UNSPECIFIED TYPE: Primary | ICD-10-CM

## 2025-07-16 DIAGNOSIS — Z30.09 VASECTOMY EVALUATION: ICD-10-CM

## 2025-07-16 NOTE — PROGRESS NOTES
"    Frank Noonan DO  [facility]  [address]  [place] [id number] [phone]    Subjective      Name Theodore Lewis MRN [id number]    2000 AGE/SEX 25 y.o. / male      Chief Complaint Chief Complaint   Patient presents with    Primary Care Follow-Up     Here for check up. Would like to discuss getting genetic testing          Visit History for  2025    Theodore Lewis is a 25 y.o. male who presented today for Primary Care Follow-Up (Here for check up. Would like to discuss getting genetic testing )       History of Present Illness  He is considering a vasectomy due to his son's recent diagnosis of autism and global developmental delay. His son also has celiac disease, which necessitates a gluten-free household. His wife is currently pregnant, and they have undergone genetic testing. He is curious about the process of adult screening for autism.    He reports no issues with sleep but mentions that his wife notes he snores. He does not experience morning headaches but feels fatigued throughout the day. He does not typically fall asleep while reading but may doze off while watching TV or after eating. He also tends to fall asleep during car rides longer than an hour.         Medications and Allergies   Current Outpatient Medications   Medication Instructions    Baclofen 15 mg, Oral, 3 Times Daily    meloxicam (MOBIC) 15 mg, Oral, Daily     No Known Allergies   I have reviewed the above medications and allergies     Objective:      Vitals Vitals:    25 0845   BP: 118/78   BP Location: Left arm   Patient Position: Sitting   Cuff Size: Adult   Pulse: 64   Resp: 18   SpO2: 97%   Weight: 107 kg (235 lb)   Height: 162.6 cm (64\")     Body mass index is 40.34 kg/m².    Physical Exam  Vitals reviewed.   Constitutional:       General: He is not in acute distress.     Appearance: He is not ill-appearing.   Pulmonary:      Effort: Pulmonary effort is normal. "   Psychiatric:         Mood and Affect: Mood normal.         Behavior: Behavior normal.         Thought Content: Thought content normal.         Judgment: Judgment normal.          Physical Exam       Results       Assessment/Plan   Issues Addressed/ Plan   Diagnosis Plan   1. Fatigue, unspecified type  Ambulatory Referral to Sleep Medicine      2. Vasectomy evaluation  Ambulatory Referral to Urology         Assessment & Plan  - Discussed the necessity of a neuropsychological test for adult autism screening.  - Reviewed the potential costs and benefits of the neuropsychological test.  - Evaluated the patient's son's genetic condition and the possibility of the patient being a carrier.  - Referral to a urologist was made for further evaluation and management of vasectomy    - Meets the criteria for sleep apnea based on reported symptoms.  - Evaluated the patient's fatigue and tendency to fall asleep in various situations.  Positive sleep score  - Discussed the benefits of undergoing a sleep study to confirm the diagnosis.  - Recommended a sleep study to potentially initiate CPAP therapy.           Patient Instructions   Jamal and justin.  May also try [place] [facility]   Follow up  recommended Return in about 3 months (around 10/16/2025).   - Dragon voice recognition software was utilized to complete this chart.  Every reasonable attempt was made to edit and correct the text, however some incorrect words may remain.   Frank Noonan DO    Patient or patient representative verbalized consent for the use of Ambient Listening during the visit with  Frank Noonan DO for chart documentation. 7/30/2025  19:05 EDT